# Patient Record
Sex: MALE | Race: WHITE | NOT HISPANIC OR LATINO | Employment: OTHER | URBAN - METROPOLITAN AREA
[De-identification: names, ages, dates, MRNs, and addresses within clinical notes are randomized per-mention and may not be internally consistent; named-entity substitution may affect disease eponyms.]

---

## 2019-09-26 ENCOUNTER — OFFICE VISIT (OUTPATIENT)
Dept: FAMILY MEDICINE CLINIC | Facility: CLINIC | Age: 29
End: 2019-09-26

## 2019-09-26 VITALS
HEART RATE: 89 BPM | TEMPERATURE: 96.6 F | WEIGHT: 171.2 LBS | RESPIRATION RATE: 22 BRPM | SYSTOLIC BLOOD PRESSURE: 122 MMHG | OXYGEN SATURATION: 98 % | DIASTOLIC BLOOD PRESSURE: 80 MMHG | HEIGHT: 70 IN | BODY MASS INDEX: 24.51 KG/M2

## 2019-09-26 DIAGNOSIS — J30.2 SEASONAL ALLERGIC RHINITIS, UNSPECIFIED TRIGGER: ICD-10-CM

## 2019-09-26 DIAGNOSIS — L02.416 CELLULITIS AND ABSCESS OF LEFT LEG: Primary | ICD-10-CM

## 2019-09-26 DIAGNOSIS — L03.116 CELLULITIS AND ABSCESS OF LEFT LEG: Primary | ICD-10-CM

## 2019-09-26 PROBLEM — F10.10 AA (ALCOHOL ABUSE): Status: ACTIVE | Noted: 2019-09-26

## 2019-09-26 PROBLEM — F41.9 ANXIETY: Status: ACTIVE | Noted: 2019-09-26

## 2019-09-26 PROBLEM — IMO0001 BLUES: Status: ACTIVE | Noted: 2019-09-26

## 2019-09-26 PROCEDURE — 99203 OFFICE O/P NEW LOW 30 MIN: CPT | Performed by: FAMILY MEDICINE

## 2019-09-26 RX ORDER — CIPROFLOXACIN 500 MG/1
500 TABLET, FILM COATED ORAL 2 TIMES DAILY
Qty: 20 TABLET | Refills: 0 | Status: SHIPPED | OUTPATIENT
Start: 2019-09-26 | End: 2019-10-06

## 2019-09-26 RX ORDER — IBUPROFEN 800 MG/1
800 TABLET ORAL EVERY 6 HOURS PRN
Qty: 60 TABLET | Refills: 0 | Status: SHIPPED | OUTPATIENT
Start: 2019-09-26

## 2019-09-26 RX ORDER — FLUTICASONE PROPIONATE 50 MCG
2 SPRAY, SUSPENSION (ML) NASAL DAILY
Qty: 1 BOTTLE | Refills: 3 | Status: SHIPPED | OUTPATIENT
Start: 2019-09-26

## 2019-09-26 NOTE — PROGRESS NOTES
Fernando Jewell is a 29 y.o. male who presents today to establish care.    Chief Complaint   Patient presents with   • Pain     left knee started about 3 days ago   • Establish Care     need pcp   • URI     about 4 days        Here to [facility] after not seeing a doctor in some time. He also has some left knee pain, redness, swelling , and hot to the touch. This has been going on for the past 3-4 days. Is has been slowly worsening. No injury. He has had cellulitis of his right knee in the past. He has not taken abx or nsaids for it. He iced it which helped the pain some but did not improve swelling or warmth. Gets worse throughout the day with worsening swelling and redness and improves some over night with elevation and ice. He works in [employer] so is on his knees a lot. He has had fever and chills with temp of 100 last night. He denies N/V/D/C, abdominal pain, chest pain, SOA, or palpitations. He has had some congestion and cough where he coughs up some mucus in the mornings. Mild throughout the day. Minimal sinus pressure. No sneezing, itchy/watery eyes, headache, or ear pain.          Review of Systems   Constitutional: Positive for chills and fever. Negative for unexpected weight loss.   HENT: Positive for congestion and sinus pressure. Negative for ear pain, sore throat and swollen glands.    Eyes: Negative for visual disturbance.   Respiratory: Positive for cough. Negative for shortness of breath and wheezing.    Cardiovascular: Negative for chest pain and palpitations.   Gastrointestinal: Negative for abdominal pain, blood in stool, constipation, diarrhea, nausea, vomiting and GERD.   Endocrine: Negative for polydipsia and polyuria.   Genitourinary: Negative for difficulty urinating.   Musculoskeletal: Negative for joint swelling.   Skin: Negative for rash and skin lesions.   Allergic/Immunologic: Negative for environmental allergies.   Neurological: Negative for seizures and syncope.    Hematological: Does not bruise/bleed easily.   Psychiatric/Behavioral: Negative for suicidal ideas.        PHQ-9 Depression Screening  Little interest or pleasure in doing things? 0   Feeling down, depressed, or hopeless? 0   Trouble falling or staying asleep, or sleeping too much?     Feeling tired or having little energy?     Poor appetite or overeating?     Feeling bad about yourself - or that you are a failure or have let yourself or your family down?     Trouble concentrating on things, such as reading the newspaper or watching television?     Moving or speaking so slowly that other people could have noticed? Or the opposite - being so fidgety or restless that you have been moving around a lot more than usual?     Thoughts that you would be better off dead, or of hurting yourself in some way?     PHQ-9 Total Score 0   If you checked off any problems, how difficult have these problems made it for you to do your work, take care of things at home, or get along with other people?         History reviewed. No pertinent past medical history.     Past Surgical History:   Procedure Laterality Date   • TEETH EXTRACTION      age 8 yrs.        Family History   Problem Relation Age of Onset   • No Known Problems Mother    • No Known Problems Father    • No Known Problems Maternal Grandmother    • Diabetes Maternal Grandfather         Social History     Socioeconomic History   • Marital status:      Spouse name: Not on file   • Number of children: Not on file   • Years of education: Not on file   • Highest education level: Not on file   Tobacco Use   • Smoking status: Current Every Day Smoker     Packs/day: 0.25     Years: 13.00     Pack years: 3.25     Types: Cigarettes   • Smokeless tobacco: Current User     Types: Snuff   Substance and Sexual Activity   • Alcohol use: No     Frequency: Never     Comment: former alcohol abuse   • Drug use: No   • Sexual activity: Yes     Partners: Female     Birth  "control/protection: None        No current outpatient medications on file prior to visit.     No current facility-administered medications on file prior to visit.        No Known Allergies     Visit Vitals  /80   Pulse 89   Temp 96.6 °F (35.9 °C)   Resp 22   Ht 177.8 cm (70\")   Wt 77.7 kg (171 lb 3.2 oz)   SpO2 98%   BMI 24.56 kg/m²      Body mass index is 24.56 kg/m².    Physical Exam   Constitutional: He is oriented to person, place, and time. He appears well-developed and well-nourished. No distress.   HENT:   Head: Atraumatic.   Eyes: EOM are normal.   Neck: Normal range of motion. Neck supple.   Cardiovascular: Normal rate, regular rhythm, normal heart sounds and intact distal pulses. Exam reveals no gallop and no friction rub.   No murmur heard.  Pulmonary/Chest: Effort normal and breath sounds normal. No respiratory distress. He has no wheezes. He has no rales.   Musculoskeletal: He exhibits no edema.        Right knee: Normal.        Left knee: He exhibits decreased range of motion (due to pain, no crepitus), swelling (minimal) and erythema. He exhibits no effusion and no ecchymosis. Tenderness (over anterior knee where erythema is present. ) found.   Neurological: He is alert and oriented to person, place, and time.   Skin: Skin is warm and dry. He is not diaphoretic. There is erythema (non circumfrential erythema or anterior left knee, hot to touch, no drainage.).   Psychiatric: He has a normal mood and affect. His behavior is normal.        No results found for this or any previous visit.     Problems Addressed this Visit        Respiratory    Seasonal allergic rhinitis    Relevant Medications    fluticasone (FLONASE) 50 MCG/ACT nasal spray    ibuprofen (ADVIL,MOTRIN) 800 MG tablet       Other    Cellulitis and abscess of left leg - Primary     Patient has physical exam findings consistent with cellulitis over anterior knee.  There is some concern for extension of infection into the joint.  Patient " states this feels exactly like cellulitis he has had in the past.  Discussed possible need for further work-up and trip to the ER to be evaluated for septic joint.  Patient does not wish to pursue further testing or evaluation at this time he would like to trial antibiotics before further work-up.  Discussed risk benefit with patient.  Patient is understanding.  Will start ciprofloxacin.  He will have close follow-up in 4 days.  Patient was instructed that if symptoms worsen he needs to seek medical care immediately.         Relevant Medications    ciprofloxacin (CIPRO) 500 MG tablet          Return in about 4 days (around 9/30/2019) for Follow-up cellulitis.    Parts of this office note have been dictated by voice recognition software. Grammatical and/or spelling errors may be present.     Akil Velasquez MD  9/27/2019

## 2019-09-26 NOTE — PATIENT INSTRUCTIONS
Cellulitis, Adult    Cellulitis is a skin infection. The infected area is usually red and tender. This condition occurs most often in the arms and lower legs. The infection can travel to the muscles, blood, and underlying tissue and become serious. It is very important to get treated for this condition.  What are the causes?  Cellulitis is caused by bacteria. The bacteria enter through a break in the skin, such as a cut, burn, insect bite, open sore, or crack.  What increases the risk?  This condition is more likely to occur in people who:  · Have a weak defense system (immune system).  · Have open wounds on the skin such as cuts, burns, bites, and scrapes. Bacteria can enter the body through these open wounds.  · Are older.  · Have diabetes.  · Have a type of long-lasting (chronic) liver disease (cirrhosis) or kidney disease.  · Use IV drugs.  What are the signs or symptoms?  Symptoms of this condition include:  · Redness, streaking, or spotting on the skin.  · Swollen area of the skin.  · Tenderness or pain when an area of the skin is touched.  · Warm skin.  · Fever.  · Chills.  · Blisters.  How is this diagnosed?  This condition is diagnosed based on a medical history and physical exam. You may also have tests, including:  · Blood tests.  · Lab tests.  · Imaging tests.  How is this treated?  Treatment for this condition may include:  · Medicines, such as antibiotic medicines or antihistamines.  · Supportive care, such as rest and application of cold or warm cloths (cold or warm compresses) to the skin.  · Hospital care, if the condition is severe.  The infection usually gets better within 1-2 days of treatment.  Follow these instructions at home:  · Take over-the-counter and prescription medicines only as told by your health care provider.  · If you were prescribed an antibiotic medicine, take it as told by your health care provider. Do not stop taking the antibiotic even if you start to feel better.  · Drink  enough fluid to keep your urine clear or pale yellow.  · Do not touch or rub the infected area.  · Raise (elevate) the infected area above the level of your heart while you are sitting or lying down.  · Apply warm or cold compresses to the affected area as told by your health care provider.  · Keep all follow-up visits as told by your health care provider. This is important. These visits let your health care provider make sure a more serious infection is not developing.  Contact a health care provider if:  · You have a fever.  · Your symptoms do not improve within 1-2 days of starting treatment.  · Your bone or joint underneath the infected area becomes painful after the skin has healed.  · Your infection returns in the same area or another area.  · You notice a swollen bump in the infected area.  · You develop new symptoms.  · You have a general ill feeling (malaise) with muscle aches and pains.  Get help right away if:  · Your symptoms get worse.  · You feel very sleepy.  · You develop vomiting or diarrhea that persists.  · You notice red streaks coming from the infected area.  · Your red area gets larger or turns dark in color.  This information is not intended to replace advice given to you by your health care provider. Make sure you discuss any questions you have with your health care provider.  Document Released: 09/27/2006 Document Revised: 04/27/2017 Document Reviewed: 10/26/2016  [employer] Interactive Patient Education © 2019 [employer]

## 2019-09-27 PROBLEM — J30.2 SEASONAL ALLERGIC RHINITIS: Status: ACTIVE | Noted: 2019-09-27

## 2019-09-27 PROBLEM — L02.416 CELLULITIS AND ABSCESS OF LEFT LEG: Status: ACTIVE | Noted: 2019-09-27

## 2019-09-27 PROBLEM — L03.116 CELLULITIS AND ABSCESS OF LEFT LEG: Status: ACTIVE | Noted: 2019-09-27

## 2019-09-27 NOTE — ASSESSMENT & PLAN NOTE
Patient has physical exam findings consistent with cellulitis over anterior knee.  There is some concern for extension of infection into the joint.  Patient states this feels exactly like cellulitis he has had in the past.  Discussed possible need for further work-up and trip to the ER to be evaluated for septic joint.  Patient does not wish to pursue further testing or evaluation at this time he would like to trial antibiotics before further work-up.  Discussed risk benefit with patient.  Patient is understanding.  Will start ciprofloxacin.  He will have close follow-up in 4 days.  Patient was instructed that if symptoms worsen he needs to seek medical care immediately.

## 2025-04-28 ENCOUNTER — OFFICE VISIT (OUTPATIENT)
Dept: FAMILY MEDICINE CLINIC | Facility: CLINIC | Age: 35
End: 2025-04-28
Payer: COMMERCIAL

## 2025-04-28 ENCOUNTER — LAB (OUTPATIENT)
Dept: LAB | Facility: HOSPITAL | Age: 35
End: 2025-04-28
Payer: COMMERCIAL

## 2025-04-28 VITALS
RESPIRATION RATE: 20 BRPM | HEART RATE: 79 BPM | TEMPERATURE: 97.8 F | DIASTOLIC BLOOD PRESSURE: 78 MMHG | WEIGHT: 183 LBS | SYSTOLIC BLOOD PRESSURE: 110 MMHG | BODY MASS INDEX: 26.2 KG/M2 | OXYGEN SATURATION: 99 % | HEIGHT: 70 IN

## 2025-04-28 DIAGNOSIS — K59.03 OPIOID-INDUCED CONSTIPATION: ICD-10-CM

## 2025-04-28 DIAGNOSIS — R30.0 DIFFICULT OR PAINFUL URINATION: Primary | ICD-10-CM

## 2025-04-28 DIAGNOSIS — Z00.00 WELLNESS EXAMINATION: ICD-10-CM

## 2025-04-28 DIAGNOSIS — T40.2X5A OPIOID-INDUCED CONSTIPATION: ICD-10-CM

## 2025-04-28 DIAGNOSIS — R30.0 DIFFICULT OR PAINFUL URINATION: ICD-10-CM

## 2025-04-28 LAB
25(OH)D3 SERPL-MCNC: 43.7 NG/ML (ref 30–100)
ALBUMIN SERPL-MCNC: 4.8 G/DL (ref 3.5–5.2)
ALBUMIN/GLOB SERPL: 1.7 G/DL
ALP SERPL-CCNC: 65 U/L (ref 39–117)
ALT SERPL W P-5'-P-CCNC: 28 U/L (ref 1–41)
ANION GAP SERPL CALCULATED.3IONS-SCNC: 11.3 MMOL/L (ref 5–15)
AST SERPL-CCNC: 25 U/L (ref 1–40)
BILIRUB BLD-MCNC: NEGATIVE MG/DL
BILIRUB SERPL-MCNC: 1.2 MG/DL (ref 0–1.2)
BUN SERPL-MCNC: 15 MG/DL (ref 6–20)
BUN/CREAT SERPL: 17 (ref 7–25)
CALCIUM SPEC-SCNC: 10 MG/DL (ref 8.6–10.5)
CHLORIDE SERPL-SCNC: 101 MMOL/L (ref 98–107)
CHOLEST SERPL-MCNC: 179 MG/DL (ref 0–200)
CLARITY, POC: CLEAR
CO2 SERPL-SCNC: 27.7 MMOL/L (ref 22–29)
COLOR UR: YELLOW
CREAT SERPL-MCNC: 0.88 MG/DL (ref 0.76–1.27)
DEPRECATED RDW RBC AUTO: 38 FL (ref 37–54)
EGFRCR SERPLBLD CKD-EPI 2021: 114.3 ML/MIN/1.73
ERYTHROCYTE [DISTWIDTH] IN BLOOD BY AUTOMATED COUNT: 11.9 % (ref 12.3–15.4)
EXPIRATION DATE: NORMAL
GLOBULIN UR ELPH-MCNC: 2.9 GM/DL
GLUCOSE SERPL-MCNC: 60 MG/DL (ref 65–99)
GLUCOSE UR STRIP-MCNC: NEGATIVE MG/DL
HBA1C MFR BLD: 5.6 % (ref 4.8–5.6)
HCT VFR BLD AUTO: 43.8 % (ref 37.5–51)
HCV AB SER QL: NORMAL
HDLC SERPL-MCNC: 69 MG/DL (ref 40–60)
HGB BLD-MCNC: 14.9 G/DL (ref 13–17.7)
HIV 1+2 AB+HIV1 P24 AG SERPL QL IA: NORMAL
KETONES UR QL: NEGATIVE
LDLC SERPL CALC-MCNC: 98 MG/DL (ref 0–100)
LDLC/HDLC SERPL: 1.41 {RATIO}
LEUKOCYTE EST, POC: NEGATIVE
Lab: NORMAL
MCH RBC QN AUTO: 30 PG (ref 26.6–33)
MCHC RBC AUTO-ENTMCNC: 34 G/DL (ref 31.5–35.7)
MCV RBC AUTO: 88.1 FL (ref 79–97)
NITRITE UR-MCNC: NEGATIVE MG/ML
PH UR: 6 [PH] (ref 5–8)
PLATELET # BLD AUTO: 202 10*3/MM3 (ref 140–450)
PMV BLD AUTO: 10.6 FL (ref 6–12)
POTASSIUM SERPL-SCNC: 4.5 MMOL/L (ref 3.5–5.2)
PROT SERPL-MCNC: 7.7 G/DL (ref 6–8.5)
PROT UR STRIP-MCNC: NEGATIVE MG/DL
PSA SERPL-MCNC: 0.58 NG/ML (ref 0–4)
RBC # BLD AUTO: 4.97 10*6/MM3 (ref 4.14–5.8)
RBC # UR STRIP: NEGATIVE /UL
SODIUM SERPL-SCNC: 140 MMOL/L (ref 136–145)
SP GR UR: 1.02 (ref 1–1.03)
TRIGL SERPL-MCNC: 63 MG/DL (ref 0–150)
TSH SERPL DL<=0.05 MIU/L-ACNC: 1.93 UIU/ML (ref 0.27–4.2)
UROBILINOGEN UR QL: NORMAL
VIT B12 BLD-MCNC: 727 PG/ML (ref 211–946)
VLDLC SERPL-MCNC: 12 MG/DL (ref 5–40)
WBC NRBC COR # BLD AUTO: 5.41 10*3/MM3 (ref 3.4–10.8)

## 2025-04-28 PROCEDURE — 80050 GENERAL HEALTH PANEL: CPT

## 2025-04-28 PROCEDURE — 83036 HEMOGLOBIN GLYCOSYLATED A1C: CPT

## 2025-04-28 PROCEDURE — 87340 HEPATITIS B SURFACE AG IA: CPT

## 2025-04-28 PROCEDURE — 82607 VITAMIN B-12: CPT

## 2025-04-28 PROCEDURE — 86706 HEP B SURFACE ANTIBODY: CPT

## 2025-04-28 PROCEDURE — 86592 SYPHILIS TEST NON-TREP QUAL: CPT

## 2025-04-28 PROCEDURE — 87491 CHLMYD TRACH DNA AMP PROBE: CPT | Performed by: STUDENT IN AN ORGANIZED HEALTH CARE EDUCATION/TRAINING PROGRAM

## 2025-04-28 PROCEDURE — 82306 VITAMIN D 25 HYDROXY: CPT

## 2025-04-28 PROCEDURE — 84153 ASSAY OF PSA TOTAL: CPT

## 2025-04-28 PROCEDURE — G0432 EIA HIV-1/HIV-2 SCREEN: HCPCS

## 2025-04-28 PROCEDURE — 36415 COLL VENOUS BLD VENIPUNCTURE: CPT

## 2025-04-28 PROCEDURE — 86803 HEPATITIS C AB TEST: CPT

## 2025-04-28 PROCEDURE — 87591 N.GONORRHOEAE DNA AMP PROB: CPT | Performed by: STUDENT IN AN ORGANIZED HEALTH CARE EDUCATION/TRAINING PROGRAM

## 2025-04-28 PROCEDURE — 86704 HEP B CORE ANTIBODY TOTAL: CPT

## 2025-04-28 PROCEDURE — 80061 LIPID PANEL: CPT

## 2025-04-28 RX ORDER — BUPRENORPHINE 100 MG/1
SOLUTION SUBCUTANEOUS
COMMUNITY
Start: 2025-03-03

## 2025-04-28 NOTE — PROGRESS NOTES
New Patient Office Visit      Patient Name: Fernando Jewell  : 1990   MRN: [id number]     Chief Complaint:  Establish Care, Peripheral Neuropathy, Urinary Frequency, and Constipation     History of Present Illness:     Urination  Patient reports delay of urine and he will have to go to the restroom at night. Smetimes he has difficulty initiating urine. Has chronic constipation no belly pain. Failed otc constipation meds. Last bm yesterdy. No blood in stool. No penile discharge.       Leg symptoms  For a year he has had orange tint to bottom of legs  No loss of sensation tingling or burning in the legs no leg swelling.      Tobacco use  He no longer smokes drinks and denies any alcohol use.       No other complaints today    Subjective        History reviewed. No pertinent past medical history.    Past Surgical History:   Procedure Laterality Date    TEETH EXTRACTION      age 8 yrs.       Family History   Problem Relation Age of Onset    Diabetes Mother     No Known Problems Father     No Known Problems Maternal Grandmother     Diabetes Maternal Grandfather     Lung cancer Paternal Grandmother     Hearing loss Paternal Grandfather        Social History     Socioeconomic History    Marital status:    Tobacco Use    Smoking status: Former     Current packs/day: 0.00     Average packs/day: 1 pack/day for 15.5 years (15.5 ttl pk-yrs)     Types: Cigarettes     Start date: 2006     Quit date: 2021     Years since quitting: 3.8     Passive exposure: Past    Smokeless tobacco: Former     Types: Chew     Quit date: 2021   Vaping Use    Vaping status: Every Day    Substances: Nicotine, Flavoring    Devices: Disposable    Passive vaping exposure: Yes   Substance and Sexual Activity    Alcohol use: Not Currently     Comment: former alcohol abuse    Drug use: Not Currently     Types: Fentanyl     Comment: 2021    Sexual activity: Yes     Partners: Female     Birth control/protection: None  "         Current Outpatient Medications:     Sublocade 100 MG/0.5ML injection, , Disp: , Rfl:     linaclotide (Linzess) 72 MCG capsule capsule, Take 1 capsule by mouth Every Morning Before Breakfast., Disp: 90 capsule, Rfl: 0    No Known Allergies    Objective     Physical Exam:  Vitals:    04/28/25 0837   BP: 110/78   BP Location: Left arm   Patient Position: Sitting   Cuff Size: Adult   Pulse: 79   Resp: 20   Temp: 97.8 °F (36.6 °C)   TempSrc: Infrared   SpO2: 99%   Weight: 83 kg (183 lb)   Height: 177.8 cm (70\")      Body mass index is 26.26 kg/m².     Physical Exam  Constitutional:       General: He is not in acute distress.     Appearance: Normal appearance.   HENT:      Head: Normocephalic and atraumatic.   Eyes:      Extraocular Movements: Extraocular movements intact.   Cardiovascular:      Rate and Rhythm: Normal rate and regular rhythm.      Heart sounds: No murmur heard.  Pulmonary:      Effort: Pulmonary effort is normal. No respiratory distress.      Breath sounds: Normal breath sounds. No stridor. No wheezing, rhonchi or rales.   Abdominal:      General: Bowel sounds are normal.      Palpations: Abdomen is soft.      Tenderness: There is no abdominal tenderness. There is no guarding or rebound.   Skin:     Findings: No rash (light orange undertone around ankles, no cleardemarcation. this is around the varicosities no swelling no open wound).   Neurological:      General: No focal deficit present.      Mental Status: He is alert.   Psychiatric:         Mood and Affect: Mood normal.          Assessment / Plan      Assessment/Plan:   Diagnoses and all orders for this visit:    1. Difficult or painful urination (Primary)  -     PSA DIAGNOSTIC ONLY; Future  -     Urinalysis With Microscopic - Urine, Clean Catch; Future  -     POCT urinalysis dipstick, automated  -     Chlamydia trachomatis, Neisseria gonorrhoeae, PCR w/ confirmation - Swab, Urine, Catheter; Future  -     RPR Qualitative with Reflex to " Quant; Future  -     HIV-1 / O / 2 Ag / Antibody; Future  -     Hepatitis B Virus (HBV) Screening and Diagnosis; Future  -     Hepatitis C Antibody; Future    2. Wellness examination  -     CBC (No Diff); Future  -     Comprehensive Metabolic Panel; Future  -     Hemoglobin A1c; Future  -     Lipid Panel; Future  -     TSH Rfx On Abnormal To Free T4; Future  -     Vitamin B12; Future  -     Vitamin D,25-Hydroxy; Future    3. Opioid-induced constipation    Other orders  -     linaclotide (Linzess) 72 MCG capsule capsule; Take 1 capsule by mouth Every Morning Before Breakfast.  Dispense: 90 capsule; Refill: 0       Skin issue  Appears to be from history of injury/venous stasis. No infection at this time. Continue to monitor.       Start linzess. Counseled on diarrhea risk.   He will seek care for any belly pain fever vomiting. None at this time present.     Urination issue  Check ua and psa.   Patient declines rectal exam to eval for any abnormal nodules as he has to use the restroom, but will consider at f/u if no improvement.     Return in about 6 weeks (around 6/9/2025).       Margarita Devi D.O.  [facility] [place]

## 2025-04-29 DIAGNOSIS — R39.9 LOWER URINARY TRACT SYMPTOMS (LUTS): Primary | ICD-10-CM

## 2025-04-29 LAB
HBV CORE AB SERPL QL IA: NEGATIVE
HBV SURFACE AB SER QL: REACTIVE
HBV SURFACE AG SERPL QL IA: NEGATIVE
IMP & REVIEW OF LAB RESULTS: NORMAL
LABORATORY COMMENT REPORT: NORMAL
RPR SER QL: NORMAL

## 2025-05-01 LAB
C TRACH RRNA SPEC QL NAA+PROBE: NEGATIVE
N GONORRHOEA RRNA SPEC QL NAA+PROBE: NEGATIVE

## 2025-05-02 ENCOUNTER — PATIENT ROUNDING (BHMG ONLY) (OUTPATIENT)
Dept: FAMILY MEDICINE CLINIC | Facility: CLINIC | Age: 35
End: 2025-05-02
Payer: COMMERCIAL

## 2025-05-19 ENCOUNTER — OFFICE VISIT (OUTPATIENT)
Age: 35
End: 2025-05-19
Payer: COMMERCIAL

## 2025-05-19 DIAGNOSIS — R39.9 LOWER URINARY TRACT SYMPTOMS (LUTS): Primary | Chronic | ICD-10-CM

## 2025-05-19 LAB
BILIRUB BLD-MCNC: NEGATIVE MG/DL
CLARITY, POC: CLEAR
COLOR UR: YELLOW
EXPIRATION DATE: NORMAL
GLUCOSE UR STRIP-MCNC: NEGATIVE MG/DL
KETONES UR QL: NEGATIVE
LEUKOCYTE EST, POC: NEGATIVE
Lab: NORMAL
NITRITE UR-MCNC: NEGATIVE MG/ML
PH UR: 6 [PH] (ref 5–8)
PROT UR STRIP-MCNC: NEGATIVE MG/DL
RBC # UR STRIP: NEGATIVE /UL
SP GR UR: 1.03 (ref 1–1.03)
UROBILINOGEN UR QL: NORMAL

## 2025-05-19 RX ORDER — VALACYCLOVIR HYDROCHLORIDE 500 MG/1
500 TABLET, FILM COATED ORAL AS NEEDED
COMMUNITY

## 2025-05-19 RX ORDER — ERGOCALCIFEROL 1.25 MG/1
1 CAPSULE, LIQUID FILLED ORAL WEEKLY
COMMUNITY
Start: 2025-04-30

## 2025-05-19 RX ORDER — TAMSULOSIN HYDROCHLORIDE 0.4 MG/1
1 CAPSULE ORAL DAILY
Qty: 30 CAPSULE | Status: CANCELLED | OUTPATIENT
Start: 2025-05-19

## 2025-05-19 RX ORDER — TAMSULOSIN HYDROCHLORIDE 0.4 MG/1
1 CAPSULE ORAL DAILY
Qty: 30 CAPSULE | Refills: 1 | Status: SHIPPED | OUTPATIENT
Start: 2025-05-19

## 2025-05-19 NOTE — LETTER
May 19, 2025     Margarita Devi DO  [address] [address]    Patient: Fernando Jewell   YOB: 1990   Date of Visit: 2025     Dear Margarita eDvi DO:       Thank you for referring Fernando Jewell to me for evaluation. Below are the relevant portions of my assessment and plan of care.    If you have questions, please do not hesitate to call me. I look forward to following Fernando along with you.         Sincerely,        Niko Coreas PA-C        CC: No Recipients    Niko Coreas PA-C  25 1636  Sign when Signing Visit       LUTS Male Office Visit      Patient Name: Fernando Jewell  : 1990   MRN: [id number]     Chief Complaint:  Lower Urinary Tract Symptoms.   Chief Complaint   Patient presents with   • LUTS (Lower Urinary Tract Symptoms)        Referring Provider: Margarita Devi DO    History of Present Illness: Mr. Jewell is a 35 y.o. male with history of lower urinary tract symptoms. For >1 year he reports urinary frequency, sometimes having to urinate 20 minutes after using the restroom, some urgency, nocturia x2, difficulty starting urinary stream, intermittency in stream, and decrease in urine stream. He drinks around 5-6 bottles of water per day, as well as 20 ounces of coffee plus a sprite or a coke later in the day. He is only sleeping around 20 hours per week. Has a 1 yo daughter.   No dysuria or gross hematuria. Recent PSA normal at 0.5 25.    He has a 15-20 pack year smoking hx  Dad has BPH. No fam hx of kidney, bladder, prostate cancer.     UA dip negative for blood and infection  PVR 0 cc     Subjective      Review of System:   Review of Systems   Genitourinary:  Positive for frequency.        Urinary stream hesitancy and intermittency   All other systems reviewed and are negative.     I have reviewed the ROS documented by my clinical staff, I have updated appropriately and I agree. Niko Coreas PA-C    Past  Medical History:  Past Medical History:   Diagnosis Date   • Urinary incontinence 1/25       Past Surgical History:  Past Surgical History:   Procedure Laterality Date   • TEETH EXTRACTION      age 8 yrs.       Medications:    Current Outpatient Medications:   •  linaclotide (Linzess) 72 MCG capsule capsule, Take 1 capsule by mouth Every Morning Before Breakfast., Disp: 90 capsule, Rfl: 0  •  Sublocade 100 MG/0.5ML injection, , Disp: , Rfl:   •  valACYclovir (VALTREX) 500 MG tablet, Take 1 tablet by mouth As Needed., Disp: , Rfl:   •  vitamin D (ERGOCALCIFEROL) 1.25 MG (24755 UT) capsule capsule, Take 1 capsule by mouth 1 (One) Time Per Week., Disp: , Rfl:     Allergies:  No Known Allergies    Social History:  Social History     Socioeconomic History   • Marital status:    Tobacco Use   • Smoking status: Former     Current packs/day: 0.00     Average packs/day: 1 pack/day for 15.5 years (15.5 ttl pk-yrs)     Types: Cigarettes     Start date: 1/1/2006     Quit date: 7/1/2021     Years since quitting: 3.8     Passive exposure: Past   • Smokeless tobacco: Former     Types: Chew     Quit date: 7/1/2021   Vaping Use   • Vaping status: Every Day   • Substances: Nicotine, Flavoring   • Devices: Disposable   • Passive vaping exposure: Yes   Substance and Sexual Activity   • Alcohol use: Not Currently     Comment: former alcohol abuse   • Drug use: Not Currently     Types: Fentanyl     Comment: 7/11/2021   • Sexual activity: Yes     Partners: Female     Birth control/protection: None       Family History:  Family History   Problem Relation Age of Onset   • Diabetes Mother    • Cancer Mother         Breast Cancer   • No Known Problems Father    • No Known Problems Maternal Grandmother    • Diabetes Maternal Grandfather    • Lung cancer Paternal Grandmother    • Cancer Paternal Grandmother         Lung Cancer   • Hearing loss Paternal Grandfather        IPSS Questionnaire (AUA-7):  Over the past month…    1)   Incomplete Emptying  How often have you had a sensation of not emptying your bladder?  2 - Less than half the time   2)  Frequency  How often have you had to urinate less than every two hours? 3 - About half the time   3)  Intermittency  How often have you found you stopped and started again several times when you urinated?  3 - About half the time   4) Urgency  How often have you found it difficult to postpone urination?  2 - Less than half the time   5) Weak Stream  How often have you had a weak urinary stream?  3 - About half the time   6) Straining  How often have you had to push or strain to begin urination?  3 - About half the time   7) Nocturia  How many times did you typically get up at night to urinate?  2 - 2 times   Total Score:  18   The International Prostate Symptom Score (IPSS) is used to screen, diagnose, track symptoms of benign prostatic hyperplasia (BPH).    0-7 pts (Mild Symptoms)  / 8-19 pts (Moderate) / 20-35 (Severe)    Quality of life due to urinary symptoms:  If you were to spend the rest of your life with your urinary condition the way it is now, how would you feel about that? 4-Mostly Dissatisfied   Urine Leakage (Incontinence) 0-No Leakage     Post void residual bladder scan:   0 ML     Objective     Physical Exam:   Vital Signs: There were no vitals filed for this visit.  There is no height or weight on file to calculate BMI.     Physical Exam  Vitals and nursing note reviewed.   Constitutional:       General: He is not in acute distress.     Appearance: Normal appearance. He is not ill-appearing.   HENT:      Head: Normocephalic and atraumatic.   Neurological:      Mental Status: He is alert.   Psychiatric:         Mood and Affect: Mood normal.         Behavior: Behavior normal.         Thought Content: Thought content normal.         Labs:   Lab Results   Component Value Date    PSA 0.583 04/28/2025       Brief Urine Lab Results  (Last result in the past 365 days)        Color    Clarity   Blood   Leuk Est   Nitrite   Protein   CREAT   Urine HCG        05/19/25 1618 Yellow   Clear   Negative   Negative   Negative   Negative                        Lab Results   Component Value Date    GLUCOSE 60 (L) 04/28/2025    CALCIUM 10.0 04/28/2025     04/28/2025    K 4.5 04/28/2025    CO2 27.7 04/28/2025     04/28/2025    BUN 15 04/28/2025    CREATININE 0.88 04/28/2025    BCR 17.0 04/28/2025    ANIONGAP 11.3 04/28/2025       Lab Results   Component Value Date    WBC 5.41 04/28/2025    HGB 14.9 04/28/2025    HCT 43.8 04/28/2025    MCV 88.1 04/28/2025     04/28/2025       Images:   No Images in the past 120 days found..    Measures:   Tobacco:   Fernando Jewell  reports that he quit smoking about 3 years ago. His smoking use included cigarettes. He started smoking about 19 years ago. He has a 15.5 pack-year smoking history. He has been exposed to tobacco smoke. He quit smokeless tobacco use about 3 years ago.  His smokeless tobacco use included chew.     Urine Incontinence: Patient reports that he is not currently experiencing any symptoms of urinary incontinence.        Assessment / Plan      Assessment:  Mr. Jewell is a 35 y.o. male who presented today with lower urinary tract symptoms. He drinks quite a bit of fluid including caffeine contributing to symptoms. Discussed working on managing fluids. UA dip negative for blood and infection. He is emptying bladder well. He will trial Floamx for 4-6 weeks. If no improvement in symptoms I will recommend pelvic floor therapy as he may have hypertonic pelvic floor muscles.     Diagnoses and all orders for this visit:    1. Lower urinary tract symptoms (LUTS) (Primary)  -     POC Urinalysis Dipstick, Automated          Follow Up:   Return in about 6 weeks (around 6/30/2025).      Niko Coreas PA-C  [facility] [place]

## 2025-05-19 NOTE — PROGRESS NOTES
LUTS Male Office Visit      Patient Name: Fernando Jewell  : 1990   MRN: [id number]     Chief Complaint:  Lower Urinary Tract Symptoms.   Chief Complaint   Patient presents with    LUTS (Lower Urinary Tract Symptoms)        Referring Provider: Margarita Devi DO    History of Present Illness: Mr. Jewell is a 35 y.o. male with history of lower urinary tract symptoms. For >1 year he reports urinary frequency, sometimes having to urinate 20 minutes after using the restroom, some urgency, nocturia x2, difficulty starting urinary stream, intermittency in stream, and decrease in urine stream. He drinks around 5-6 bottles of water per day, as well as 20 ounces of coffee plus a sprite or a coke later in the day. He is only sleeping around 20 hours per week. Has a 1 yo daughter.   No dysuria or gross hematuria. Recent PSA normal at 0.5 25.    He has a 15-20 pack year smoking hx  Dad has BPH. No fam hx of kidney, bladder, prostate cancer.     UA dip negative for blood and infection  PVR 0 cc     Subjective      Review of System:   Review of Systems   Genitourinary:  Positive for frequency.        Urinary stream hesitancy and intermittency   All other systems reviewed and are negative.     I have reviewed the ROS documented by my clinical staff, I have updated appropriately and I agree. Niko Coreas PA-C    Past Medical History:  Past Medical History:   Diagnosis Date    Urinary incontinence        Past Surgical History:  Past Surgical History:   Procedure Laterality Date    TEETH EXTRACTION      age 8 yrs.       Medications:    Current Outpatient Medications:     linaclotide (Linzess) 72 MCG capsule capsule, Take 1 capsule by mouth Every Morning Before Breakfast., Disp: 90 capsule, Rfl: 0    Sublocade 100 MG/0.5ML injection, , Disp: , Rfl:     valACYclovir (VALTREX) 500 MG tablet, Take 1 tablet by mouth As Needed., Disp: , Rfl:     vitamin D (ERGOCALCIFEROL) 1.25 MG (25879 UT) capsule capsule,  Take 1 capsule by mouth 1 (One) Time Per Week., Disp: , Rfl:     Allergies:  No Known Allergies    Social History:  Social History     Socioeconomic History    Marital status:    Tobacco Use    Smoking status: Former     Current packs/day: 0.00     Average packs/day: 1 pack/day for 15.5 years (15.5 ttl pk-yrs)     Types: Cigarettes     Start date: 1/1/2006     Quit date: 7/1/2021     Years since quitting: 3.8     Passive exposure: Past    Smokeless tobacco: Former     Types: Chew     Quit date: 7/1/2021   Vaping Use    Vaping status: Every Day    Substances: Nicotine, Flavoring    Devices: Disposable    Passive vaping exposure: Yes   Substance and Sexual Activity    Alcohol use: Not Currently     Comment: former alcohol abuse    Drug use: Not Currently     Types: Fentanyl     Comment: 7/11/2021    Sexual activity: Yes     Partners: Female     Birth control/protection: None       Family History:  Family History   Problem Relation Age of Onset    Diabetes Mother     Cancer Mother         Breast Cancer    No Known Problems Father     No Known Problems Maternal Grandmother     Diabetes Maternal Grandfather     Lung cancer Paternal Grandmother     Cancer Paternal Grandmother         Lung Cancer    Hearing loss Paternal Grandfather        IPSS Questionnaire (AUA-7):  Over the past month…    1)  Incomplete Emptying  How often have you had a sensation of not emptying your bladder?  2 - Less than half the time   2)  Frequency  How often have you had to urinate less than every two hours? 3 - About half the time   3)  Intermittency  How often have you found you stopped and started again several times when you urinated?  3 - About half the time   4) Urgency  How often have you found it difficult to postpone urination?  2 - Less than half the time   5) Weak Stream  How often have you had a weak urinary stream?  3 - About half the time   6) Straining  How often have you had to push or strain to begin urination?  3 - About  half the time   7) Nocturia  How many times did you typically get up at night to urinate?  2 - 2 times   Total Score:  18   The International Prostate Symptom Score (IPSS) is used to screen, diagnose, track symptoms of benign prostatic hyperplasia (BPH).    0-7 pts (Mild Symptoms)  / 8-19 pts (Moderate) / 20-35 (Severe)    Quality of life due to urinary symptoms:  If you were to spend the rest of your life with your urinary condition the way it is now, how would you feel about that? 4-Mostly Dissatisfied   Urine Leakage (Incontinence) 0-No Leakage     Post void residual bladder scan:   0 ML     Objective     Physical Exam:   Vital Signs: There were no vitals filed for this visit.  There is no height or weight on file to calculate BMI.     Physical Exam  Vitals and nursing note reviewed.   Constitutional:       General: He is not in acute distress.     Appearance: Normal appearance. He is not ill-appearing.   HENT:      Head: Normocephalic and atraumatic.   Neurological:      Mental Status: He is alert.   Psychiatric:         Mood and Affect: Mood normal.         Behavior: Behavior normal.         Thought Content: Thought content normal.         Labs:   Lab Results   Component Value Date    PSA 0.583 04/28/2025       Brief Urine Lab Results  (Last result in the past 365 days)        Color   Clarity   Blood   Leuk Est   Nitrite   Protein   CREAT   Urine HCG        05/19/25 1618 Yellow   Clear   Negative   Negative   Negative   Negative                        Lab Results   Component Value Date    GLUCOSE 60 (L) 04/28/2025    CALCIUM 10.0 04/28/2025     04/28/2025    K 4.5 04/28/2025    CO2 27.7 04/28/2025     04/28/2025    BUN 15 04/28/2025    CREATININE 0.88 04/28/2025    BCR 17.0 04/28/2025    ANIONGAP 11.3 04/28/2025       Lab Results   Component Value Date    WBC 5.41 04/28/2025    HGB 14.9 04/28/2025    HCT 43.8 04/28/2025    MCV 88.1 04/28/2025     04/28/2025       Images:   No Images in the  past 120 days found..    Measures:   Tobacco:   Fernando Jewell  reports that he quit smoking about 3 years ago. His smoking use included cigarettes. He started smoking about 19 years ago. He has a 15.5 pack-year smoking history. He has been exposed to tobacco smoke. He quit smokeless tobacco use about 3 years ago.  His smokeless tobacco use included chew.     Urine Incontinence: Patient reports that he is not currently experiencing any symptoms of urinary incontinence.        Assessment / Plan      Assessment:  Mr. Jewell is a 35 y.o. male who presented today with lower urinary tract symptoms. He drinks quite a bit of fluid including caffeine contributing to symptoms. Discussed working on managing fluids. UA dip negative for blood and infection. He is emptying bladder well. He will trial Floamx for 4-6 weeks. If no improvement in symptoms I will recommend pelvic floor therapy as he may have hypertonic pelvic floor muscles.     Diagnoses and all orders for this visit:    1. Lower urinary tract symptoms (LUTS) (Primary)  -     POC Urinalysis Dipstick, Automated          Follow Up:   Return in about 6 weeks (around 6/30/2025).      Niko Coreas PA-C  [facility]

## 2025-06-09 ENCOUNTER — OFFICE VISIT (OUTPATIENT)
Dept: FAMILY MEDICINE CLINIC | Facility: CLINIC | Age: 35
End: 2025-06-09
Payer: COMMERCIAL

## 2025-06-09 VITALS
WEIGHT: 182.8 LBS | HEART RATE: 69 BPM | BODY MASS INDEX: 26.17 KG/M2 | SYSTOLIC BLOOD PRESSURE: 122 MMHG | HEIGHT: 70 IN | RESPIRATION RATE: 18 BRPM | TEMPERATURE: 97.3 F | OXYGEN SATURATION: 99 % | DIASTOLIC BLOOD PRESSURE: 82 MMHG

## 2025-06-09 DIAGNOSIS — Z00.00 ANNUAL PHYSICAL EXAM: Primary | ICD-10-CM

## 2025-06-09 PROCEDURE — 99395 PREV VISIT EST AGE 18-39: CPT | Performed by: STUDENT IN AN ORGANIZED HEALTH CARE EDUCATION/TRAINING PROGRAM

## 2025-06-09 RX ORDER — VALACYCLOVIR HYDROCHLORIDE 500 MG/1
500 TABLET, FILM COATED ORAL DAILY
Qty: 90 TABLET | Refills: 3 | Status: SHIPPED | OUTPATIENT
Start: 2025-06-09

## 2025-06-09 NOTE — PROGRESS NOTES
"Chief Complaint  Difficulty Urinating (Follow up on this.)    Difficulty Urinating        Annual exam  Tobacco use: counseled against vaping. He is not ready at this time.   Counseled on diet and exercise  hiv hep c sti screening: up to date   a1c /lipid screening up to date   Vaccines recommended:  covid vaccine  Pneumonia  gardisil  Tdap  hepatitis    He reports the linzess did help him. No problems with it.     Taking valtrex daily to prevent hsv. Taking with a glass of water.    He has no complaints today.         The following portions of the patient's history were reviewed and updated as appropriate: allergies, current medications, past family history, past medical history, past social history, past surgical history, and problem list.    OBJECTIVE:  /82 (BP Location: Right arm, Patient Position: Sitting, Cuff Size: Adult)   Pulse 69   Temp 97.3 °F (36.3 °C) (Temporal)   Resp 18   Ht 177.8 cm (70\")   Wt 82.9 kg (182 lb 12.8 oz)   SpO2 99%   BMI 26.23 kg/m²       Physical Exam  Constitutional:       General: He is not in acute distress.     Appearance: Normal appearance.   HENT:      Head: Normocephalic and atraumatic.   Eyes:      Extraocular Movements: Extraocular movements intact.   Cardiovascular:      Rate and Rhythm: Normal rate and regular rhythm.      Heart sounds: No murmur heard.  Pulmonary:      Effort: Pulmonary effort is normal. No respiratory distress.      Breath sounds: Normal breath sounds. No stridor. No wheezing, rhonchi or rales.   Skin:     Findings: No rash.   Neurological:      General: No focal deficit present.      Mental Status: He is alert.   Psychiatric:         Mood and Affect: Mood normal.                    Assessment and Plan   Diagnoses and all orders for this visit:    1. Annual physical exam (Primary)    Other orders  -     valACYclovir (VALTREX) 500 MG tablet; Take 1 tablet by mouth Daily.  Dispense: 90 tablet; Refill: 3  -     linaclotide (Linzess) 72 MCG capsule " capsule; Take 1 capsule by mouth Every Morning Before Breakfast.  Dispense: 90 capsule; Refill: 3        Annual exam  Tobacco use: counseled against vaping. He is not ready at this time.   Counseled on diet and exercise  hiv hep c sti screening: up to date   a1c /lipid screening up to date   Vaccines recommended:  covid vaccine  Pneumonia  gardisil  Tdap  hepatitis  Return in about 1 year (around 6/9/2026) for Annual physical.       Margarita Devi D.O.  [facility] [place]

## 2025-06-11 DIAGNOSIS — R39.9 LOWER URINARY TRACT SYMPTOMS (LUTS): Chronic | ICD-10-CM

## 2025-06-11 NOTE — TELEPHONE ENCOUNTER
Rx Refill Note  Requested Prescriptions      No prescriptions requested or ordered in this encounter      Last office visit with prescribing clinician: 5/19/2025   Last telemedicine visit with prescribing clinician: Visit date not found   Next office visit with prescribing clinician: 6/30/2025       Angelina Bender MA  06/11/25, 16:22 EDT

## 2025-06-12 RX ORDER — TAMSULOSIN HYDROCHLORIDE 0.4 MG/1
1 CAPSULE ORAL DAILY
Qty: 90 CAPSULE | Refills: 0 | Status: SHIPPED | OUTPATIENT
Start: 2025-06-12

## 2025-06-30 ENCOUNTER — OFFICE VISIT (OUTPATIENT)
Age: 35
End: 2025-06-30
Payer: COMMERCIAL

## 2025-06-30 DIAGNOSIS — R35.0 URINARY FREQUENCY: Primary | Chronic | ICD-10-CM

## 2025-06-30 NOTE — PROGRESS NOTES
LUTS Male Office Visit      Patient Name: Fernando Jewell  : 1990   MRN: [id number]     Chief Complaint:  Lower Urinary Tract Symptoms.   Chief Complaint   Patient presents with    Lower urinary tract symptoms (LUTS)        Referring Provider: Margarita Devi DO    History of Present Illness: Mr. Jewell is a 35 y.o. male presents for urinary frequency. Ongoing for >1 year. Sometimes having to urinate 20 minutes after using the restroom, nocturia x2. He can hold urine for quite some time when he does have to go. No urge incontinence. He drinks around 5-6 bottles of water per day, as well as 20 ounces of coffee plus sometimes a sprite or a coke later in the day. He snores at night. Gets very little sleep.   No dysuria or gross hematuria. Recent PSA normal at 0.5 25. He has tried 4 week trial of Flomax without any improvement in symptoms. Urine was negative for blood and infection last visit and PVR was 0 mL at that time.       Subjective      Review of System:   Review of Systems   Genitourinary:  Positive for frequency.   All other systems reviewed and are negative.     I have reviewed the ROS documented by my clinical staff, I have updated appropriately and I agree. Niko Coreas PA-C    Past Medical History:  Past Medical History:   Diagnosis Date    Urinary incontinence        Past Surgical History:  Past Surgical History:   Procedure Laterality Date    TEETH EXTRACTION      age 8 yrs.       Medications:    Current Outpatient Medications:     linaclotide (Linzess) 72 MCG capsule capsule, Take 1 capsule by mouth Every Morning Before Breakfast., Disp: 90 capsule, Rfl: 3    Sublocade 100 MG/0.5ML injection, , Disp: , Rfl:     valACYclovir (VALTREX) 500 MG tablet, Take 1 tablet by mouth Daily., Disp: 90 tablet, Rfl: 3    vitamin D (ERGOCALCIFEROL) 1.25 MG (30822 UT) capsule capsule, Take 1 capsule by mouth 1 (One) Time Per Week., Disp: , Rfl:     Allergies:  No Known Allergies    Social  History:  Social History     Socioeconomic History    Marital status:    Tobacco Use    Smoking status: Former     Current packs/day: 0.00     Average packs/day: 1 pack/day for 15.5 years (15.5 ttl pk-yrs)     Types: Cigarettes     Start date: 2006     Quit date: 2021     Years since quittin.0     Passive exposure: Past    Smokeless tobacco: Former     Types: Chew     Quit date: 2021   Vaping Use    Vaping status: Every Day    Substances: Nicotine, Flavoring    Devices: Disposable    Passive vaping exposure: Yes   Substance and Sexual Activity    Alcohol use: Not Currently     Comment: former alcohol abuse    Drug use: Not Currently     Types: Fentanyl     Comment: 2021    Sexual activity: Yes     Partners: Female     Birth control/protection: None       Family History:  Family History   Problem Relation Age of Onset    Diabetes Mother     Cancer Mother         Breast Cancer    No Known Problems Father     No Known Problems Maternal Grandmother     Diabetes Maternal Grandfather     Lung cancer Paternal Grandmother     Cancer Paternal Grandmother         Lung Cancer    Hearing loss Paternal Grandfather        IPSS Questionnaire (AUA-7):  Over the past month…    1)  Incomplete Emptying:       How often have you had a sensation of not emptying you had the sensation of not emptying your bladder completely after you finished urinating?  2 - Less than half the time   2)  Frequency:       How often have you had the urinate again less than two hours after you finished urinating?  2 - Less than half the time   3)  Intermittency:       How often have you found you stopped and started again several times when you urinated?   3 - About half the time   4) Urgency:      How often have you found it difficult to postpone urination?  2 - Less than half the time   5) Weak Stream:      How often have you had a weak urinary stream?  3 - About half the time   6) Straining:       How often have you had to push  or strain to begin urination?  1 - Less than 1 time in 5   7) Nocturia:      How many times did you most typically get up to urinate from the time you went to bed at night until the time you got up in the morning?  2 - 2 times   Total Score:  15   The International Prostate Symptom Score (IPSS) is used to screen, diagnose, track symptoms of benign prostatic hyperplasia (BPH).   0-7 (Mild Symptoms) 8-19 (Moderate) 20-35 (Severe)   Quality of Life (QoL):  If you were to spend the rest of your life with your urinary condition just the way it is now, how would you feel about that? 3-Mixed   Urine Leakage (Incontinence) 0-No Leakage      Objective     Physical Exam:   Vital Signs: There were no vitals filed for this visit.  There is no height or weight on file to calculate BMI.     Physical Exam  Vitals and nursing note reviewed.   Constitutional:       General: He is not in acute distress.     Appearance: Normal appearance. He is not ill-appearing.   HENT:      Head: Normocephalic and atraumatic.   Neurological:      Mental Status: He is alert.   Psychiatric:         Mood and Affect: Mood normal.         Behavior: Behavior normal.         Thought Content: Thought content normal.         Labs:   Lab Results   Component Value Date    PSA 0.583 04/28/2025       Brief Urine Lab Results  (Last result in the past 365 days)        Color   Clarity   Blood   Leuk Est   Nitrite   Protein   CREAT   Urine HCG        05/19/25 1618 Yellow   Clear   Negative   Negative   Negative   Negative                        Lab Results   Component Value Date    GLUCOSE 60 (L) 04/28/2025    CALCIUM 10.0 04/28/2025     04/28/2025    K 4.5 04/28/2025    CO2 27.7 04/28/2025     04/28/2025    BUN 15 04/28/2025    CREATININE 0.88 04/28/2025    BCR 17.0 04/28/2025    ANIONGAP 11.3 04/28/2025       Lab Results   Component Value Date    WBC 5.41 04/28/2025    HGB 14.9 04/28/2025    HCT 43.8 04/28/2025    MCV 88.1 04/28/2025      04/28/2025       Images:   No Images in the past 120 days found..    Measures:   Tobacco:   Fernando Jewell  reports that he quit smoking about 4 years ago. His smoking use included cigarettes. He started smoking about 19 years ago. He has a 15.5 pack-year smoking history. He has been exposed to tobacco smoke. He quit smokeless tobacco use about 4 years ago.  His smokeless tobacco use included chew.     Urine Incontinence: Patient reports that he is not currently experiencing any symptoms of urinary incontinence.        Assessment / Plan      Assessment:  Mr. Jewell is a 35 y.o. male who presented today for f/u of urinary frequency. Urine last visit did not show infection. PVR was 0 mL. He doesn't have significant urgency and no urge incontinence. No improvement in symptoms from Flomax so he will stop it. Discussed option of pelvic floor PT. He declines at this time due to cost and time constraints. Discussed managing fluid intake. We will plan to continue to monitor for now and see him back in 3 months for symptom check and another PVR.     Diagnoses and all orders for this visit:    1. Urinary frequency (Primary)            Follow Up:   Return in about 3 months (around 9/30/2025).      Niok Coreas PA-C  [facility] [place]